# Patient Record
Sex: FEMALE | Race: BLACK OR AFRICAN AMERICAN | NOT HISPANIC OR LATINO
[De-identification: names, ages, dates, MRNs, and addresses within clinical notes are randomized per-mention and may not be internally consistent; named-entity substitution may affect disease eponyms.]

---

## 2023-07-11 ENCOUNTER — APPOINTMENT (OUTPATIENT)
Dept: ORTHOPEDIC SURGERY | Facility: CLINIC | Age: 28
End: 2023-07-11
Payer: COMMERCIAL

## 2023-07-11 DIAGNOSIS — M76.892 OTHER SPECIFIED ENTHESOPATHIES OF LEFT LOWER LIMB, EXCLUDING FOOT: ICD-10-CM

## 2023-07-11 PROCEDURE — 72170 X-RAY EXAM OF PELVIS: CPT

## 2023-07-11 PROCEDURE — 99203 OFFICE O/P NEW LOW 30 MIN: CPT

## 2023-07-11 PROCEDURE — 72100 X-RAY EXAM L-S SPINE 2/3 VWS: CPT

## 2023-07-11 NOTE — PHYSICAL EXAM
[Left] : left hip [NL (120)] : flexion 120 degrees [NL (35)] : adduction 35 degrees [2+] : posterior tibialis pulse: 2+ [NL (90)] : forward flexion 90 degrees [NL (30)] : right lateral rotation 30 degrees [NL (45)] : extension 45 degrees [NL (40)] : right lateral bending 40 degrees [5___] : right extensor hallicus longus 5[unfilled]/5 [No bony abnormalities] : No bony abnormalities [] : non-antalgic [de-identified] : pain with resisted SLR

## 2023-07-11 NOTE — ASSESSMENT
[FreeTextEntry1] : PT referral \par NSAID's prn\par activities as tolerated\par f/u in 4 weeks or sooner with Dr Muhammad if no better

## 2023-07-11 NOTE — HISTORY OF PRESENT ILLNESS
[Gradual] : gradual [7] : 7 [Radiating] : radiating [Throbbing] : throbbing [Constant] : constant [Full time] : Work status: full time [de-identified] : 28 YF with left hip pain for a week or so.  No injury.  Pain worse after working out.  Pain radiates to the anterior thigh as well. [] : no [FreeTextEntry5] : Pt was working out and on Sunday 7/9/23 aggravated  left hip pain. She has been having left hip pain for the last 2 weeks. didn’t try any meds or use ice or heat